# Patient Record
Sex: MALE | Race: WHITE | NOT HISPANIC OR LATINO | Employment: STUDENT | URBAN - METROPOLITAN AREA
[De-identification: names, ages, dates, MRNs, and addresses within clinical notes are randomized per-mention and may not be internally consistent; named-entity substitution may affect disease eponyms.]

---
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
## 2024-04-05 ENCOUNTER — TELEPHONE (OUTPATIENT)
Age: 19
End: 2024-04-05

## 2024-04-05 NOTE — TELEPHONE ENCOUNTER
Called patient to advise, PCP Dr. Hand no longer with [facility]. Unable to leave message, call could not be completed at this time.NOT IN SERVICE

## 2024-10-31 ENCOUNTER — TELEPHONE (OUTPATIENT)
Dept: FAMILY MEDICINE CLINIC | Facility: CLINIC | Age: 19
End: 2024-10-31

## 2024-10-31 NOTE — TELEPHONE ENCOUNTER
Patient attribution    Patient is now under the care of RASHMI Boswell at [facility]    Please update chart and remove our office as pcp    Thank you